# Patient Record
Sex: MALE | Race: WHITE | NOT HISPANIC OR LATINO | URBAN - METROPOLITAN AREA
[De-identification: names, ages, dates, MRNs, and addresses within clinical notes are randomized per-mention and may not be internally consistent; named-entity substitution may affect disease eponyms.]

---

## 2017-02-15 ENCOUNTER — INPATIENT (INPATIENT)
Facility: HOSPITAL | Age: 42
LOS: 0 days | Discharge: HOME | End: 2017-02-16
Attending: INTERNAL MEDICINE | Admitting: INTERNAL MEDICINE

## 2017-06-27 DIAGNOSIS — M62.82 RHABDOMYOLYSIS: ICD-10-CM

## 2017-06-27 DIAGNOSIS — E86.1 HYPOVOLEMIA: ICD-10-CM

## 2017-06-27 DIAGNOSIS — Z79.84 LONG TERM (CURRENT) USE OF ORAL HYPOGLYCEMIC DRUGS: ICD-10-CM

## 2017-06-27 DIAGNOSIS — E66.01 MORBID (SEVERE) OBESITY DUE TO EXCESS CALORIES: ICD-10-CM

## 2017-06-27 DIAGNOSIS — E87.1 HYPO-OSMOLALITY AND HYPONATREMIA: ICD-10-CM

## 2017-06-27 DIAGNOSIS — N17.9 ACUTE KIDNEY FAILURE, UNSPECIFIED: ICD-10-CM

## 2017-06-27 DIAGNOSIS — J40 BRONCHITIS, NOT SPECIFIED AS ACUTE OR CHRONIC: ICD-10-CM

## 2017-06-27 DIAGNOSIS — J18.9 PNEUMONIA, UNSPECIFIED ORGANISM: ICD-10-CM

## 2017-06-27 DIAGNOSIS — E11.65 TYPE 2 DIABETES MELLITUS WITH HYPERGLYCEMIA: ICD-10-CM

## 2022-11-15 PROBLEM — Z00.00 ENCOUNTER FOR PREVENTIVE HEALTH EXAMINATION: Status: ACTIVE | Noted: 2022-11-15

## 2022-11-28 ENCOUNTER — APPOINTMENT (OUTPATIENT)
Dept: CARDIOLOGY | Facility: CLINIC | Age: 47
End: 2022-11-28

## 2022-11-28 ENCOUNTER — RESULT CHARGE (OUTPATIENT)
Age: 47
End: 2022-11-28

## 2022-11-28 VITALS
WEIGHT: 315 LBS | HEART RATE: 89 BPM | SYSTOLIC BLOOD PRESSURE: 149 MMHG | HEIGHT: 75 IN | DIASTOLIC BLOOD PRESSURE: 95 MMHG | BODY MASS INDEX: 39.17 KG/M2

## 2022-11-28 DIAGNOSIS — Z82.49 FAMILY HISTORY OF ISCHEMIC HEART DISEASE AND OTHER DISEASES OF THE CIRCULATORY SYSTEM: ICD-10-CM

## 2022-11-28 DIAGNOSIS — Z78.9 OTHER SPECIFIED HEALTH STATUS: ICD-10-CM

## 2022-11-28 DIAGNOSIS — Z87.891 PERSONAL HISTORY OF NICOTINE DEPENDENCE: ICD-10-CM

## 2022-11-28 DIAGNOSIS — L40.50 ARTHROPATHIC PSORIASIS, UNSPECIFIED: ICD-10-CM

## 2022-11-28 PROCEDURE — 93000 ELECTROCARDIOGRAM COMPLETE: CPT

## 2022-11-28 PROCEDURE — 99204 OFFICE O/P NEW MOD 45 MIN: CPT | Mod: 25

## 2022-11-28 RX ORDER — AMLODIPINE BESYLATE 5 MG/1
5 TABLET ORAL
Qty: 30 | Refills: 0 | Status: ACTIVE | COMMUNITY
Start: 2022-10-26

## 2022-11-28 RX ORDER — METFORMIN HYDROCHLORIDE 1000 MG/1
1000 TABLET, COATED ORAL
Qty: 180 | Refills: 0 | Status: ACTIVE | COMMUNITY
Start: 2022-07-29

## 2022-11-28 RX ORDER — LOSARTAN POTASSIUM AND HYDROCHLOROTHIAZIDE 25; 100 MG/1; MG/1
100-25 TABLET ORAL
Qty: 30 | Refills: 0 | Status: ACTIVE | COMMUNITY
Start: 2022-10-26

## 2022-11-28 NOTE — ASSESSMENT
[FreeTextEntry1] : 47 y/o male with HTN, DL, DM2 - uncontrolled, morbid obesity, likley ROSANNA\par Palpitations. Chest pain.\par \par Plan:\par \par Chest pain:\par Schedule stress echo to assess for ischemia\par ECG - normal\par \par Palpitations:\par Obtain MCOT for 30 days, r/o PAF\par Decrease coffee intake\par \par Obesity:\par Patient was advised about healthy lifestyle changes, including diet and exercise. Importance of sustained long-term weight loss was discussed, questions answered.\par \par DM\par C/w metformin, diet. Check Glucose, HgA1c - Rx sent today\par \par DL\par Check lipid panel and direct LDL (last TG over 500)\par Diet\par F/u after the labs\par \par HTN\par C/w losartan, HCTZ\par low salt diet\par weight loss\par \par ROSANNA\par Schedule pulmonary evaluation. Will need sleep study to assess.\par \par In the meantime, I have also advised the patient to go to the nearest emergency room if he experiences any chest pain, dyspnea, syncope, or has any other compelling symptoms.\par \par If stable - return after the tests.

## 2022-11-28 NOTE — REVIEW OF SYSTEMS
[Chest Discomfort] : chest discomfort [Palpitations] : palpitations [Negative] : Heme/Lymph [SOB] : no shortness of breath [Dyspnea on exertion] : not dyspnea during exertion [Lower Ext Edema] : no extremity edema [Leg Claudication] : no intermittent leg claudication [Orthopnea] : no orthopnea [PND] : no PND [Syncope] : no syncope

## 2022-11-28 NOTE — HISTORY OF PRESENT ILLNESS
[FreeTextEntry1] : Pt is 46 year old male with PMH of Obesity,  HTN, DM, psoriatic arthritis, Pneumonia with 2 ospitalizations. receiving Remicade infusions Q6 weeks. Pt is here to establish care.  Pt c.o episodes of palpitations that occurred at rest and lasted for 5 min.  Last episode occurred 1 week ago.  Pt has episodes of chest pressure that occurred at rest and while walking.  Pt denies SOB.  Pt is active all day.   Pt is drinking 4 cups of coffee per day.  Pt snores at night.\par 07/29/22 LDL/ Trig  couldn't be calculated due to trig >400   Grandfathers maternal and paternal diet of MI at age 55.

## 2022-12-01 LAB
ALBUMIN SERPL ELPH-MCNC: 4.4 G/DL
ALP BLD-CCNC: 63 U/L
ALT SERPL-CCNC: 40 U/L
ANION GAP SERPL CALC-SCNC: 15 MMOL/L
AST SERPL-CCNC: 18 U/L
BASOPHILS # BLD AUTO: 0.04 K/UL
BASOPHILS NFR BLD AUTO: 0.6 %
BILIRUB SERPL-MCNC: 0.4 MG/DL
BUN SERPL-MCNC: 18 MG/DL
CALCIUM SERPL-MCNC: 10.5 MG/DL
CHLORIDE SERPL-SCNC: 93 MMOL/L
CHOLEST SERPL-MCNC: 242 MG/DL
CO2 SERPL-SCNC: 24 MMOL/L
CREAT SERPL-MCNC: 0.84 MG/DL
EGFR: 110 ML/MIN/1.73M2
EOSINOPHIL # BLD AUTO: 0.17 K/UL
EOSINOPHIL NFR BLD AUTO: 2.4 %
ESTIMATED AVERAGE GLUCOSE: 243 MG/DL
GLUCOSE SERPL-MCNC: 236 MG/DL
HBA1C MFR BLD HPLC: 10.1 %
HCT VFR BLD CALC: 41.6 %
HDLC SERPL-MCNC: 51 MG/DL
HGB BLD-MCNC: 14.2 G/DL
IMM GRANULOCYTES NFR BLD AUTO: 0.3 %
LDLC SERPL CALC-MCNC: NORMAL MG/DL
LDLC SERPL DIRECT ASSAY-MCNC: 131 MG/DL
LYMPHOCYTES # BLD AUTO: 2.48 K/UL
LYMPHOCYTES NFR BLD AUTO: 35 %
MAN DIFF?: NORMAL
MCHC RBC-ENTMCNC: 32.1 PG
MCHC RBC-ENTMCNC: 34.1 GM/DL
MCV RBC AUTO: 94.1 FL
MONOCYTES # BLD AUTO: 0.62 K/UL
MONOCYTES NFR BLD AUTO: 8.7 %
NEUTROPHILS # BLD AUTO: 3.76 K/UL
NEUTROPHILS NFR BLD AUTO: 53 %
NONHDLC SERPL-MCNC: 192 MG/DL
NT-PROBNP SERPL-MCNC: 30 PG/ML
PLATELET # BLD AUTO: 253 K/UL
POTASSIUM SERPL-SCNC: 4.5 MMOL/L
PROT SERPL-MCNC: 7.4 G/DL
RBC # BLD: 4.42 M/UL
RBC # FLD: 12.2 %
SODIUM SERPL-SCNC: 133 MMOL/L
TRIGL SERPL-MCNC: 412 MG/DL
TSH SERPL-ACNC: 2.82 UIU/ML
WBC # FLD AUTO: 7.09 K/UL

## 2022-12-01 RX ORDER — ROSUVASTATIN CALCIUM 10 MG/1
10 TABLET, FILM COATED ORAL DAILY
Qty: 90 | Refills: 3 | Status: ACTIVE | COMMUNITY
Start: 2022-12-01 | End: 1900-01-01

## 2022-12-01 RX ORDER — DAPAGLIFLOZIN 10 MG/1
10 TABLET, FILM COATED ORAL DAILY
Qty: 90 | Refills: 1 | Status: ACTIVE | COMMUNITY
Start: 2022-12-01 | End: 1900-01-01

## 2022-12-07 ENCOUNTER — APPOINTMENT (OUTPATIENT)
Dept: CARDIOLOGY | Facility: CLINIC | Age: 47
End: 2022-12-07

## 2022-12-07 PROCEDURE — 93325 DOPPLER ECHO COLOR FLOW MAPG: CPT

## 2022-12-07 PROCEDURE — 93320 DOPPLER ECHO COMPLETE: CPT

## 2022-12-07 PROCEDURE — 93351 STRESS TTE COMPLETE: CPT

## 2023-01-17 ENCOUNTER — APPOINTMENT (OUTPATIENT)
Dept: CARDIOLOGY | Facility: CLINIC | Age: 48
End: 2023-01-17
Payer: COMMERCIAL

## 2023-01-17 DIAGNOSIS — R00.2 PALPITATIONS: ICD-10-CM

## 2023-01-17 PROCEDURE — 93228 REMOTE 30 DAY ECG REV/REPORT: CPT

## 2023-02-06 PROBLEM — R00.2 INTERMITTENT PALPITATIONS: Status: ACTIVE | Noted: 2022-11-28

## 2023-02-21 ENCOUNTER — APPOINTMENT (OUTPATIENT)
Dept: CARDIOLOGY | Facility: CLINIC | Age: 48
End: 2023-02-21
Payer: COMMERCIAL

## 2023-02-21 ENCOUNTER — APPOINTMENT (OUTPATIENT)
Dept: CARDIOLOGY | Facility: CLINIC | Age: 48
End: 2023-02-21

## 2023-02-21 VITALS
HEART RATE: 112 BPM | DIASTOLIC BLOOD PRESSURE: 82 MMHG | BODY MASS INDEX: 39.17 KG/M2 | WEIGHT: 315 LBS | SYSTOLIC BLOOD PRESSURE: 136 MMHG | HEIGHT: 75 IN

## 2023-02-21 DIAGNOSIS — E11.9 TYPE 2 DIABETES MELLITUS W/OUT COMPLICATIONS: ICD-10-CM

## 2023-02-21 DIAGNOSIS — E66.01 MORBID (SEVERE) OBESITY DUE TO EXCESS CALORIES: ICD-10-CM

## 2023-02-21 DIAGNOSIS — E78.5 HYPERLIPIDEMIA, UNSPECIFIED: ICD-10-CM

## 2023-02-21 DIAGNOSIS — I10 ESSENTIAL (PRIMARY) HYPERTENSION: ICD-10-CM

## 2023-02-21 DIAGNOSIS — R07.89 OTHER CHEST PAIN: ICD-10-CM

## 2023-02-21 PROCEDURE — 99214 OFFICE O/P EST MOD 30 MIN: CPT | Mod: 25

## 2023-02-21 PROCEDURE — 93000 ELECTROCARDIOGRAM COMPLETE: CPT

## 2023-02-21 NOTE — HISTORY OF PRESENT ILLNESS
[FreeTextEntry1] : Pt is 46 year old male with PMH of Obesity,  HTN, DM, psoriatic arthritis, Pneumonia with 2 hospitalizations. receiving Remicade infusions Q6 weeks. Presenting for f/u. Still with occasional palpitations.  No chest pain or chest pressure, denies SOB.  Pt is active all day.    Pt snores at night.\par 07/29/22 LDL/ Trig  couldn't be calculated due to trig >400   Grandfathers maternal and paternal diet of MI at age 55. Labs noted HgA1c 10.1, , Chol 242, HDL 51, LDL direct 141. Started on Crestor and Farxiga. Stress echo was negative at the level achieved (79%). Normal EF.

## 2023-02-21 NOTE — ASSESSMENT
[FreeTextEntry1] : 47 y/o male with HTN, DL, DM2 - uncontrolled, morbid obesity, likley ROSANNA\par Palpitations. Chest pain.\par \par Plan:\par \par Chest pain:\par Negative stress echo for ischemia, although submaximal.\par ECG - normal\par C/w prevention\par \par Palpitations:\par Negative MCOT, no PAF\par Decrease coffee intake\par \par Obesity:\par Patient was advised about healthy lifestyle changes, including diet and exercise. Importance of sustained long-term weight loss was discussed, questions answered.\par \par DM\par C/w metformin, diet. Add Farxiga. Check Glucose, HgA1c - Rx sent today\par \par DL\par Started Crestor.\par Check lipid panel and direct LDL (last TG over 400)\par Diet\par F/u after the labs, may need to add Vascepa\par \par HTN\par C/w losartan, HCTZ\par low salt diet\par weight loss\par \par ROSANNA\par Recommend pulmonary evaluation. Will need sleep study to assess.\par \par F/u in 3 months

## 2023-02-21 NOTE — REVIEW OF SYSTEMS
[SOB] : no shortness of breath [Dyspnea on exertion] : not dyspnea during exertion [Chest Discomfort] : chest discomfort [Lower Ext Edema] : no extremity edema [Leg Claudication] : no intermittent leg claudication [Palpitations] : palpitations [Orthopnea] : no orthopnea [PND] : no PND [Syncope] : no syncope [Negative] : Heme/Lymph

## 2023-06-26 ENCOUNTER — APPOINTMENT (OUTPATIENT)
Dept: CARDIOLOGY | Facility: CLINIC | Age: 48
End: 2023-06-26

## 2025-01-27 NOTE — REASON FOR VISIT
Addended by: GABRIELLA LANCASTER on: 1/27/2025 04:48 PM     Modules accepted: Level of Service     [CV Risk Factors and Non-Cardiac Disease] : CV risk factors and non-cardiac disease [Hyperlipidemia] : hyperlipidemia [Hypertension] : hypertension